# Patient Record
Sex: FEMALE
[De-identification: names, ages, dates, MRNs, and addresses within clinical notes are randomized per-mention and may not be internally consistent; named-entity substitution may affect disease eponyms.]

---

## 2022-02-10 PROBLEM — Z00.00 ENCOUNTER FOR PREVENTIVE HEALTH EXAMINATION: Status: ACTIVE | Noted: 2022-02-10

## 2022-05-27 ENCOUNTER — APPOINTMENT (OUTPATIENT)
Dept: NEUROLOGY | Facility: CLINIC | Age: 67
End: 2022-05-27

## 2022-09-08 ENCOUNTER — APPOINTMENT (OUTPATIENT)
Dept: NEUROLOGY | Facility: CLINIC | Age: 67
End: 2022-09-08

## 2024-06-06 ENCOUNTER — OFFICE (OUTPATIENT)
Dept: URBAN - METROPOLITAN AREA CLINIC 12 | Facility: CLINIC | Age: 69
Setting detail: OPHTHALMOLOGY
End: 2024-06-06
Payer: MEDICARE

## 2024-06-06 DIAGNOSIS — H35.62: ICD-10-CM

## 2024-06-06 DIAGNOSIS — H25.13: ICD-10-CM

## 2024-06-06 DIAGNOSIS — H40.033: ICD-10-CM

## 2024-06-06 DIAGNOSIS — H43.813: ICD-10-CM

## 2024-06-06 DIAGNOSIS — H40.013: ICD-10-CM

## 2024-06-06 PROCEDURE — 92250 FUNDUS PHOTOGRAPHY W/I&R: CPT | Performed by: OPHTHALMOLOGY

## 2024-06-06 PROCEDURE — 92004 COMPRE OPH EXAM NEW PT 1/>: CPT | Performed by: OPHTHALMOLOGY

## 2024-06-06 ASSESSMENT — CONFRONTATIONAL VISUAL FIELD TEST (CVF)
OD_FINDINGS: FULL
OS_FINDINGS: FULL

## 2024-06-07 ENCOUNTER — OFFICE (OUTPATIENT)
Dept: URBAN - METROPOLITAN AREA CLINIC 88 | Facility: CLINIC | Age: 69
Setting detail: OPHTHALMOLOGY
End: 2024-06-07
Payer: MEDICARE

## 2024-06-07 DIAGNOSIS — H43.813: ICD-10-CM

## 2024-06-07 PROBLEM — H25.13 CATARACT; BOTH EYES: Status: ACTIVE | Noted: 2024-06-06

## 2024-06-07 PROBLEM — H40.013 GLAUCOMA SUSPECT, LOW RISK 1-2 FACTORS; BOTH EYES: Status: ACTIVE | Noted: 2024-06-06

## 2024-06-07 PROBLEM — H40.033 NARROW ANGLE SUSPECT; BOTH EYES: Status: ACTIVE | Noted: 2024-06-06

## 2024-06-07 PROCEDURE — 92134 CPTRZ OPH DX IMG PST SGM RTA: CPT | Performed by: OPHTHALMOLOGY

## 2024-06-07 PROCEDURE — 92201 OPSCPY EXTND RTA DRAW UNI/BI: CPT | Performed by: OPHTHALMOLOGY

## 2024-06-07 PROCEDURE — 92014 COMPRE OPH EXAM EST PT 1/>: CPT | Performed by: OPHTHALMOLOGY

## 2024-06-07 ASSESSMENT — CONFRONTATIONAL VISUAL FIELD TEST (CVF)
OS_FINDINGS: FULL
OD_FINDINGS: FULL

## 2024-07-02 ENCOUNTER — APPOINTMENT (OUTPATIENT)
Dept: NEUROLOGY | Facility: CLINIC | Age: 69
End: 2024-07-02
Payer: MEDICARE

## 2024-07-02 VITALS
SYSTOLIC BLOOD PRESSURE: 120 MMHG | HEIGHT: 62 IN | DIASTOLIC BLOOD PRESSURE: 77 MMHG | HEART RATE: 76 BPM | BODY MASS INDEX: 29.08 KG/M2 | WEIGHT: 158 LBS

## 2024-07-02 DIAGNOSIS — R41.9 UNSPECIFIED SYMPTOMS AND SIGNS INVOLVING COGNITIVE FUNCTIONS AND AWARENESS: ICD-10-CM

## 2024-07-02 PROCEDURE — 99204 OFFICE O/P NEW MOD 45 MIN: CPT

## 2024-07-18 ENCOUNTER — APPOINTMENT (OUTPATIENT)
Dept: NEUROLOGY | Facility: CLINIC | Age: 69
End: 2024-07-18

## 2024-07-23 ENCOUNTER — APPOINTMENT (OUTPATIENT)
Dept: NEUROLOGY | Facility: CLINIC | Age: 69
End: 2024-07-23

## 2024-08-09 ENCOUNTER — APPOINTMENT (OUTPATIENT)
Dept: NEUROLOGY | Facility: CLINIC | Age: 69
End: 2024-08-09

## 2024-08-09 PROCEDURE — 99213 OFFICE O/P EST LOW 20 MIN: CPT

## 2024-08-09 NOTE — HISTORY OF PRESENT ILLNESS
[FreeTextEntry1] : Informant: patient and  PMD Stanley López [phone]  Светлана Espinal is a 68-year woman who is here for folllow-up.  Recent MRI brain.  She said her mind is working good now.  Family is not concerned.  Anxiety improved.   First seen by me at 2024 Patient said she sometimes she forgets things over past few years. She said sometimes she mixes up grandchildren's name. She has 4 grandchildren. She said she sometimes forgets what she needs to do. She sometimes opens refrigerator and forgets why. A few times she left food on stove. She said her long term memory is relatively intact. She loses things. She said she did not lose things before. She denied focusing or concentrating. She reported  feeling nervous when loses things or doesn't remember name. She denied disorientation, wandering or getting lost.  said she gets nervous easily especially when he is driving.  denied noticing she forgets things. No one else complains about her cognition. They denied problems with aggression, disinhibition, impulsiveness, inappropriate behavior, dietary changes, hallucination/delusions or personality change. No difficulty dressing, bathing, eating, toileting, cooking, shopping, managing finances or administering medications.  last year had covid - had imaging of head after a fall went to hospital  Neuro ROS: -Hx head trauma: denies -developmental/birth defects: denies -Headache: occasionally -Incontinence: denies -Vertigo/lightheadedness: says occasional dizziness when puts head on pill- went to ENT they said everything is normal -Seizures: denies -Weakness: denies -Sensory changes: some paresthesia's in feet fingers -Changes in taste/smell: denies -Visual changes: denies -Gait/Balance/falls: denies change -Tremor/abnormal movements: denies -Dysphagia: denies -Syncope/autonomic dysfunction: passed out 2023 with covid  Neuropsychiatric: -Sleep: Denies difficulty falling. she awakes early 3-4 am then goes back to slerep. . Denies verbalizations, movements, abnormal dreams movements or snoring, -Appetite: denies weight or appetite changes -Anxiety: denies anxiety -Depression/Apathy: denies changes in interests, energy, guilt, or hopelessness -Suicidal/Homicidal ideations: denies -Hallucinations/illusions: denies  PHQ2 over the last 2 weeks do u have? (0-none, 1-several days, 2-more than half days, 3-nearly every day) -little interest or pleasure in doing things: 0 -feeling down, depressed, or hopeless: 0  FUNCTIONALITY QUESTIONS (ACTIVITIES of DAILY LIVING ([place])  Completely independent (2) Needs some help (1) Unable, or needs major assistance (0) Bathing/Showerin Dressin Toiletin Transferrin Continence: 2 Feedin Total score (0-12) =12 Lower score = greater impairment  INDEPENDENT ACTIVITIES of DAILY LIVING ([place]) Ability to Use Telephone: 2 Shoppin Food Preparation:2 Housekeepin Laundry:2 Transportation: stopped driving because she says she is scared and knee hurts when she drives. Responsibility for Own Medications: 2 Ability to Handle Finances: 2 she manages household finances Total score (0-16) =16  SOCIAL HX -Smoking Hx: denies -Illicit drugs: denies -Alcohol Hx: denies -Birthplace: [facility], immigrated in  at age 23 -Marital status:  -Lives with:  -Children: 2 -Occupation: retired  worked at [employer]  PMHx -HTN -DM2 -OA  PSH -R knee surgery  -left knee surgery 2009 -hysterectomy 1996  FAMILY HX -Mother: DM, Heart disease,  age 86 -Father:  age 86 ? cognitive symptoms  ALLERGIES -NKA  MEDs -Toan 5.40mg qd -HCTZ 25mg qd -metformin 500mg bid -farxiga 10mg qd -rybelsus 14mg qd -ASA 81mg qd -famotidine 40mgf qd -vit D  GENERAL EXAMINATION General: Pleasant, well groomed, and in no apparent distress. Skin: Anicteric with no significant lesions noted. Eyes: Anicteric Head/Ears/Mouth/Nose/Throat: NC/AT, conjunctiva clear. Neck: Supple, full ROM. Respiratory: No respiratory distress Cardiovascular: Regular rate and rhythm. Gastrointestinal: Soft, non-tender, non-distended with no masses. Extremities: No edema or calf tenderness, Back: No deformities, no spinal tenderness  NEUROLOGIC EXAMINATION Mental Status: awake, alert, pleasant, oriented x3, speech fluent without word finding pauses Cranial Nerves: VFF PERRL, EOMI without nystagmus, facial sensation intact, face symmetric, hearing intact to fingerrub, palate raises symmetrically, shoulder shrug intact, tongue midline. No dysarthria. Motor: -Tone: normal -Strength: No pronator drift. Strength is 5/5 in bilateral upper and lower extremities Adventitial movements: No tremors noted. Sensation: Romberg mildly positive Reflexes: 1+ at brachioradialis, biceps, triceps, patellar, bilaterally. Coordination: Intact with finger-to-nose bilaterally. Gait: Slow antalgic Steady, with a narrow base. Able to stand on heels and toes. mild difficulty with tandem. Normal pull test Frontal release signs: No grasp reflex. No palmomental reflex. No glabellar reflex.  Workup -MMSE: 2024 29/30 orientation 10/10, recall 2/3 -MRi brain done outside uploaded to [facility] 2024 minimal white matter changes and age related atrophy  ASSESSMENT: Светлана Espinal is a 68 year with a subjective cognitive decline over the past few. No functional decline. MMSE 29/30. Exam unremarkable. Unclear if normal aging and anxiety vs early neurodegenerative disease.  Discussed testing options and possible treatment options including NP testing and FDG/amyloid pet and possible treatment with lecanamab.  She says symptoms improved and thinks they are anxiety related and wants to wait to see if symptoms return or family has concerns.   Reviewed MRI images with patient &    PLAN: -Advanced Directives: deferred Cognitive symptoms: -discussed diagnostic & treatment options-can do amyloid scan and or NP testing if symptoms worsen -could be lecanamab candidate if symptoms return -discussed diet, exercise and sociability to optimize cognition Anxiety -Consider SSRI-discussed she does not want meds.  follow up- as needed